# Patient Record
Sex: FEMALE | Race: BLACK OR AFRICAN AMERICAN | NOT HISPANIC OR LATINO | URBAN - METROPOLITAN AREA
[De-identification: names, ages, dates, MRNs, and addresses within clinical notes are randomized per-mention and may not be internally consistent; named-entity substitution may affect disease eponyms.]

---

## 2024-11-27 ENCOUNTER — APPOINTMENT (RX ONLY)
Dept: URBAN - METROPOLITAN AREA CLINIC 47 | Facility: CLINIC | Age: 39
Setting detail: DERMATOLOGY
End: 2024-11-27

## 2024-11-27 DIAGNOSIS — Z41.9 ENCOUNTER FOR PROCEDURE FOR PURPOSES OTHER THAN REMEDYING HEALTH STATE, UNSPECIFIED: ICD-10-CM

## 2024-11-27 PROCEDURE — ? PHOTO-DOCUMENTATION

## 2024-11-27 PROCEDURE — ? COSMETIC CONSULTATION: LHR

## 2024-11-27 ASSESSMENT — LOCATION SIMPLE DESCRIPTION DERM: LOCATION SIMPLE: SUBMENTAL CHIN

## 2024-11-27 ASSESSMENT — LOCATION ZONE DERM: LOCATION ZONE: FACE

## 2024-11-27 ASSESSMENT — LOCATION DETAILED DESCRIPTION DERM: LOCATION DETAILED: SUBMENTAL CHIN

## 2024-12-06 ENCOUNTER — APPOINTMENT (OUTPATIENT)
Dept: URBAN - METROPOLITAN AREA CLINIC 47 | Facility: CLINIC | Age: 39
Setting detail: DERMATOLOGY
End: 2024-12-06

## 2024-12-06 DIAGNOSIS — L81.4 OTHER MELANIN HYPERPIGMENTATION: ICD-10-CM | Status: STABLE

## 2024-12-06 DIAGNOSIS — Z41.9 ENCOUNTER FOR PROCEDURE FOR PURPOSES OTHER THAN REMEDYING HEALTH STATE, UNSPECIFIED: ICD-10-CM

## 2024-12-06 PROCEDURE — ? PHOTO-DOCUMENTATION

## 2024-12-06 PROCEDURE — ? COUNSELING

## 2024-12-06 PROCEDURE — 99212 OFFICE O/P EST SF 10 MIN: CPT

## 2024-12-06 PROCEDURE — ? PRESCRIPTION MEDICATION MANAGEMENT

## 2024-12-06 PROCEDURE — ? FULL BODY SKIN EXAM - DECLINED

## 2024-12-06 PROCEDURE — ? LASER HAIR REMOVAL

## 2024-12-06 ASSESSMENT — LOCATION DETAILED DESCRIPTION DERM
LOCATION DETAILED: RIGHT INFERIOR VERMILION LIP
LOCATION DETAILED: LEFT INFERIOR VERMILION LIP

## 2024-12-06 ASSESSMENT — LOCATION ZONE DERM: LOCATION ZONE: LIP

## 2024-12-06 ASSESSMENT — LOCATION SIMPLE DESCRIPTION DERM
LOCATION SIMPLE: LEFT INFERIOR LIP
LOCATION SIMPLE: RIGHT INFERIOR LIP

## 2024-12-06 NOTE — PROCEDURE: LASER HAIR REMOVAL
Treatment Number: 0
Tolerated Procedure (Optional): Tolerated Well
Consent: Written consent obtained, risks reviewed including but not limited to crusting, scabbing, blistering, scarring, darker or lighter pigmentary change, paradoxical hair regrowth, incomplete removal of hair and infection.
Laser Type: diode 810nm
Length Of Topical Anesthesia Application (Optional): 30 minutes
Fluence (Will Not Render If 0): 20
Total Pulses: 244
Were Eye Shields Employed?: Yes
Price (Use Numbers Only, No Special Characters Or $): 042
Eye Shield Text: Given the treatment area eye shields were inserted prior to treatment.
Shaving (Optional): The patient shaved at home
Render Post-Care In The Note: No
Treatment Number: 1
Post-Care Instructions: I reviewed with the patient in detail post-care instructions. Patient should avoid sun for a minimum of 4 weeks before and after treatment.
Number Of Prepaid Treatments (Will Not Render If 0): 6
Post-Procedure Care: Immediate endpoint: perifollicular erythema and edema. Vaseline and ice applied. Post care reviewed with patient.
Pre-Procedure: Prior to proceeding the treatment areas were cleaned and all present put on their eye protection.
Cooling Override: strong
Fluence (Will Not Render If 0): 10
Detail Level: Detailed

## 2025-01-07 ENCOUNTER — APPOINTMENT (OUTPATIENT)
Dept: URBAN - METROPOLITAN AREA CLINIC 47 | Facility: CLINIC | Age: 40
Setting detail: DERMATOLOGY
End: 2025-01-07

## 2025-01-07 DIAGNOSIS — Z41.9 ENCOUNTER FOR PROCEDURE FOR PURPOSES OTHER THAN REMEDYING HEALTH STATE, UNSPECIFIED: ICD-10-CM

## 2025-01-07 PROCEDURE — ? LASER HAIR REMOVAL

## 2025-01-07 NOTE — PROCEDURE: LASER HAIR REMOVAL
Treatment Number: 0
Fluence (Will Not Render If 0): 20
Post-Procedure Care: Immediate endpoint: perifollicular erythema and edema. Vaseline and ice applied. Post care reviewed with patient.
Detail Level: Detailed
Laser Type: diode 810nm
Consent: Written consent obtained, risks reviewed including but not limited to crusting, scabbing, blistering, scarring, darker or lighter pigmentary change, paradoxical hair regrowth, incomplete removal of hair and infection.
Total Pulses: 264
Were Eye Shields Employed?: No
Post-Care Instructions: I reviewed with the patient in detail post-care instructions. Patient should avoid sun for a minimum of 4 weeks before and after treatment.
Pre-Procedure: Prior to proceeding the treatment areas were cleaned and all present put on their eye protection.
Fluence (Will Not Render If 0): 10
Eye Shield Text: Given the treatment area eye shields were inserted prior to treatment.
Cooling Override: strong

## 2025-02-19 ENCOUNTER — APPOINTMENT (OUTPATIENT)
Dept: URBAN - METROPOLITAN AREA CLINIC 47 | Facility: CLINIC | Age: 40
Setting detail: DERMATOLOGY
End: 2025-02-19

## 2025-02-19 DIAGNOSIS — Z41.9 ENCOUNTER FOR PROCEDURE FOR PURPOSES OTHER THAN REMEDYING HEALTH STATE, UNSPECIFIED: ICD-10-CM

## 2025-02-19 PROCEDURE — ? PHOTO-DOCUMENTATION

## 2025-02-19 PROCEDURE — ? LASER HAIR REMOVAL

## 2025-02-19 NOTE — PROCEDURE: LASER HAIR REMOVAL
Message  Received: Today  Yessenia Madison MD P Essad, K Neu Nurse Msg Pool  This patient has results that are mildly abnormal because of low folate. She needs to start a supplement 1 gram po daily. Cortisol is high but this is actually ok because of the comparison to the other lbs, would be moreso reflective of a high stress state  Please inform patient that no additional workup needs to be done because of this.  
Treatment Number: 3
Cooling Override: strong
Topical Anesthesia Type: 7% tetracaine, 23% lidocaine
Fluence (Will Not Render If 0): 20
Number Of Prepaid Treatments (Will Not Render If 0): 0
Shaving (Optional): The patient was shaved in the office prior to the procedure
Eye Shield Text: Given the treatment area eye shields were inserted prior to treatment.
Fluence (Will Not Render If 0): 10
Pre-Procedure: Prior to proceeding the treatment areas were cleaned and all present put on their eye protection.
Consent: Written consent obtained, risks reviewed including but not limited to crusting, scabbing, blistering, scarring, darker or lighter pigmentary change, paradoxical hair regrowth, incomplete removal of hair and infection.
Total Pulses: 126
Tolerated Procedure (Optional): Tolerated Well
Laser Type: diode 810nm
Post-Procedure Care: Immediate endpoint: perifollicular erythema and edema. Vaseline and ice applied. Post care reviewed with patient.
Detail Level: Detailed
Post-Care Instructions: I reviewed with the patient in detail post-care instructions. Patient should avoid sun for a minimum of 4 weeks before and after treatment.
Were Eye Shields Employed?: No

## 2025-03-18 ENCOUNTER — APPOINTMENT (OUTPATIENT)
Dept: URBAN - METROPOLITAN AREA CLINIC 47 | Facility: CLINIC | Age: 40
Setting detail: DERMATOLOGY
End: 2025-03-18

## 2025-03-18 DIAGNOSIS — Z41.9 ENCOUNTER FOR PROCEDURE FOR PURPOSES OTHER THAN REMEDYING HEALTH STATE, UNSPECIFIED: ICD-10-CM

## 2025-03-18 PROCEDURE — ? PHOTO-DOCUMENTATION

## 2025-03-18 PROCEDURE — ? LASER HAIR REMOVAL

## 2025-03-18 ASSESSMENT — LOCATION ZONE DERM: LOCATION ZONE: SCALP

## 2025-03-18 ASSESSMENT — LOCATION DETAILED DESCRIPTION DERM: LOCATION DETAILED: LEFT CENTRAL OCCIPITAL SCALP

## 2025-03-18 ASSESSMENT — LOCATION SIMPLE DESCRIPTION DERM: LOCATION SIMPLE: SCALP

## 2025-03-18 NOTE — PROCEDURE: LASER HAIR REMOVAL
Topical Anesthesia Type: 7% tetracaine, 23% lidocaine
Render Post-Care In The Note: No
Fluence (Will Not Render If 0): 20
Post-Care Instructions: I reviewed with the patient in detail post-care instructions. Patient should avoid sun for a minimum of 4 weeks before and after treatment.
Treatment Number: 0
Pre-Procedure: Prior to proceeding the treatment areas were cleaned and all present put on their eye protection.
Cooling Override: strong
Detail Level: Detailed
Fluence (Will Not Render If 0): 10
Eye Shield Text: Given the treatment area eye shields were inserted prior to treatment.
Treatment Number: 4
Shaving (Optional): The patient was shaved in the office prior to the procedure
Consent: Written consent obtained, risks reviewed including but not limited to crusting, scabbing, blistering, scarring, darker or lighter pigmentary change, paradoxical hair regrowth, incomplete removal of hair and infection.
Tolerated Procedure (Optional): Tolerated Well
Post-Procedure Care: Immediate endpoint: perifollicular erythema and edema. Vaseline and ice applied. Post care reviewed with patient.
Laser Type: diode 810nm
Total Pulses: 216

## 2025-04-18 ENCOUNTER — APPOINTMENT (OUTPATIENT)
Dept: URBAN - METROPOLITAN AREA CLINIC 47 | Facility: CLINIC | Age: 40
Setting detail: DERMATOLOGY
End: 2025-04-18

## 2025-04-18 DIAGNOSIS — Z41.9 ENCOUNTER FOR PROCEDURE FOR PURPOSES OTHER THAN REMEDYING HEALTH STATE, UNSPECIFIED: ICD-10-CM

## 2025-04-18 PROCEDURE — ? LASER HAIR REMOVAL

## 2025-04-18 PROCEDURE — ? PHOTO-DOCUMENTATION

## 2025-04-18 NOTE — PROCEDURE: LASER HAIR REMOVAL
Eye Shield Text: Given the treatment area eye shields were inserted prior to treatment.
Treatment Number: 0
Total Area (Optional- Include Units): lip, chin, jawline
Pulse Duration (Include Units): long
Post-Procedure Care: Immediate endpoint: perifollicular erythema and edema. Vaseline and ice applied. Post care reviewed with patient.
Length Of Topical Anesthesia Application (Optional): 30 minutes
Post-Care Instructions: I reviewed with the patient in detail post-care instructions. Patient should avoid sun for a minimum of 4 weeks before and after treatment.
Fluence (Will Not Render If 0): 20
Shaving (Optional): The patient was shaved in the office prior to the procedure
Detail Level: Detailed
Tolerated Procedure (Optional): Tolerated Well
Laser Type: diode 810nm
Total Pulses: 249
Render Post-Care In The Note: No
Treatment Number: 5
Were Eye Shields Employed?: Yes
Comments: [facility] #5 of 6 prepaid
Cooling Override: strong
Topical Anesthesia Type: 7% tetracaine, 23% lidocaine
Pre-Procedure: Prior to proceeding the treatment areas were cleaned and all present put on their eye protection.
Fluence (Will Not Render If 0): 11
Consent: Written consent obtained, risks reviewed including but not limited to crusting, scabbing, blistering, scarring, darker or lighter pigmentary change, paradoxical hair regrowth, incomplete removal of hair and infection.
Number Of Prepaid Treatments (Will Not Render If 0): 6

## 2025-06-06 ENCOUNTER — APPOINTMENT (OUTPATIENT)
Dept: URBAN - METROPOLITAN AREA CLINIC 47 | Facility: CLINIC | Age: 40
Setting detail: DERMATOLOGY
End: 2025-06-06

## 2025-06-06 DIAGNOSIS — Z41.9 ENCOUNTER FOR PROCEDURE FOR PURPOSES OTHER THAN REMEDYING HEALTH STATE, UNSPECIFIED: ICD-10-CM

## 2025-06-06 PROCEDURE — ? LASER HAIR REMOVAL

## 2025-06-06 PROCEDURE — ? PHOTO-DOCUMENTATION

## 2025-06-06 NOTE — PROCEDURE: LASER HAIR REMOVAL
Consent: Written consent obtained, risks reviewed including but not limited to crusting, scabbing, blistering, scarring, darker or lighter pigmentary change, paradoxical hair regrowth, incomplete removal of hair and infection.
Number Of Prepaid Treatments (Will Not Render If 0): 0
Eye Shield Text: Given the treatment area eye shields were inserted prior to treatment.
Post-Care Instructions: I reviewed with the patient in detail post-care instructions. Patient should avoid sun for a minimum of 4 weeks before and after treatment.
Pre-Procedure: Prior to proceeding the treatment areas were cleaned and all present put on their eye protection.
Topical Anesthesia Type: 7% tetracaine, 23% lidocaine
Treatment Number: 6
Total Pulses: 264
Fluence (Will Not Render If 0): 20
Post-Procedure Care: Immediate endpoint: perifollicular erythema and edema. Vaseline and ice applied. Post care reviewed with patient.
Detail Level: Detailed
Render Post-Care In The Note: No
Shaving (Optional): The patient was shaved in the office prior to the procedure
Length Of Topical Anesthesia Application (Optional): 30 minutes
Cooling Override: strong
Tolerated Procedure (Optional): Tolerated Well
Laser Type: Cynosure 1064 [address]
Were Eye Shields Employed?: Yes
Fluence (Will Not Render If 0): 10